# Patient Record
Sex: FEMALE | Race: WHITE | NOT HISPANIC OR LATINO | URBAN - METROPOLITAN AREA
[De-identification: names, ages, dates, MRNs, and addresses within clinical notes are randomized per-mention and may not be internally consistent; named-entity substitution may affect disease eponyms.]

---

## 2022-10-21 ENCOUNTER — OUTPATIENT (OUTPATIENT)
Dept: OUTPATIENT SERVICES | Facility: HOSPITAL | Age: 22
LOS: 1 days | Discharge: HOME | End: 2022-10-21

## 2022-10-21 VITALS
SYSTOLIC BLOOD PRESSURE: 120 MMHG | HEART RATE: 76 BPM | DIASTOLIC BLOOD PRESSURE: 58 MMHG | TEMPERATURE: 98 F | RESPIRATION RATE: 16 BRPM

## 2022-10-21 VITALS — TEMPERATURE: 98 F

## 2022-10-21 LAB
APPEARANCE UR: CLEAR — SIGNIFICANT CHANGE UP
BILIRUB UR-MCNC: NEGATIVE — SIGNIFICANT CHANGE UP
COLOR SPEC: SIGNIFICANT CHANGE UP
DIFF PNL FLD: NEGATIVE — SIGNIFICANT CHANGE UP
GLUCOSE UR QL: NEGATIVE — SIGNIFICANT CHANGE UP
KETONES UR-MCNC: ABNORMAL
LEUKOCYTE ESTERASE UR-ACNC: NEGATIVE — SIGNIFICANT CHANGE UP
NITRITE UR-MCNC: NEGATIVE — SIGNIFICANT CHANGE UP
PH UR: 6 — SIGNIFICANT CHANGE UP (ref 5–8)
PROT UR-MCNC: NEGATIVE — SIGNIFICANT CHANGE UP
SP GR SPEC: 1 — LOW (ref 1.01–1.03)
UROBILINOGEN FLD QL: SIGNIFICANT CHANGE UP

## 2022-10-21 NOTE — OB PROVIDER TRIAGE NOTE - NSHPPHYSICALEXAM_GEN_ALL_CORE
Vital Signs Last 24 Hrs  T(C): 36.7 (21 Oct 2022 18:56), Max: 36.9 (21 Oct 2022 18:50)  T(F): 98 (21 Oct 2022 18:56), Max: 98.4 (21 Oct 2022 18:50)  HR: 76 (21 Oct 2022 18:56) (76 - 76)  BP: 120/58 (21 Oct 2022 18:56) (120/58 - 120/58)  RR: 16 (21 Oct 2022 18:56) (16 - 16)  SpO2: 99    Gen: AOx3, no acute distress  Heart: s1, s2, no murmurs, rubs or gallops  Lungs: CTAB  Abd: soft, gravid, non tender, no palpable contractions, no CVA tenderness  Ext: No edema bilaterally  Speculum: no pooling, no bleeding, cervix appears closed. dried red blood noted around anus.  BSS: MVP 5.41, variable presentation, no previa, +FHR, CL 3.43  SVE: 0/0/-3  variable presentation, intact

## 2022-10-21 NOTE — OB PROVIDER TRIAGE NOTE - HISTORY OF PRESENT ILLNESS
21y/o  at 20w4d dated by early sonogram with [facility] 3/6/2022, presents for an episode of vaginal bleeding when wiping this morning. She states she had a bowl movement and noticed blood on the tissue. She denies cramping or LOF. Patient states she is often constipated and does not take any medication for it. No other complications this pregnancy. GBS unknown. Denies fevers, chills, nausea, vomiting, diarrhea, dysuria or urinary frequency.    last intercourse: 72 hours ago  Last BM: this afternoon  Last PO: 10am         21y/o  at 20w4d dated by early sonogram with [facility] 3/6/2022, presents for an episode of bleeding when wiping this morning. She states she had a bowel movement and noticed blood on the tissue. She denies cramping or LOF. Patient states she is often constipated and does not take any medication for it. No other complications this pregnancy. GBS unknown. Denies dizziness, lightheadedness, SOB, CP or palpitations. Denies fevers, chills, nausea, vomiting, diarrhea, dysuria or urinary frequency.    last intercourse: 72 hours ago  Last BM: this afternoon  Last PO: 10am

## 2022-10-21 NOTE — OB PROVIDER TRIAGE NOTE - NSICDXFAMILYHX_GEN_ALL_CORE_FT
Left message for patient to call to schedule for fasting lab.   FAMILY HISTORY:  No pertinent family history in first degree relatives

## 2022-10-21 NOTE — OB RN TRIAGE NOTE - FALL HARM RISK - UNIVERSAL INTERVENTIONS
Bed in lowest position, wheels locked, appropriate side rails in place/Call bell, personal items and telephone in reach/Instruct patient to call for assistance before getting out of bed or chair/Non-slip footwear when patient is out of bed/[place] to call system/Physically safe environment - no spills, clutter or unnecessary equipment/Purposeful Proactive Rounding/Room/bathroom lighting operational, light cord in reach

## 2022-10-21 NOTE — OB RN TRIAGE NOTE - NS_TRIAGEADDITIONAL COMMENTS_OBGYN_ALL_OB_FT
pt presents by ambulance for "spotting" . patient states she saw pink after bm, then again after void

## 2022-10-21 NOTE — OB PROVIDER TRIAGE NOTE - NSOBPROVIDERNOTE_OBGYN_ALL_OB_FT
23yo  @ 20w4ds, GBS unknown, not in  labor, with no visualized vaginal bleeding on exam, reassuring fetal and maternal status.    -Discharged Home  - labor precautions reviewed  -PO Hydration encouraged  -Follow up with scheduled OB visit in [place]  -Patient verbalized understanding    Dr. Keating and Dr. Márquez aware

## 2022-10-23 LAB
CULTURE RESULTS: SIGNIFICANT CHANGE UP
SPECIMEN SOURCE: SIGNIFICANT CHANGE UP